# Patient Record
Sex: FEMALE
[De-identification: names, ages, dates, MRNs, and addresses within clinical notes are randomized per-mention and may not be internally consistent; named-entity substitution may affect disease eponyms.]

---

## 2020-03-16 ENCOUNTER — HOSPITAL ENCOUNTER (OUTPATIENT)
Dept: HOSPITAL 90 - RAH | Age: 40
Discharge: HOME | End: 2020-03-16
Attending: INTERNAL MEDICINE
Payer: COMMERCIAL

## 2020-03-16 DIAGNOSIS — Z12.31: Primary | ICD-10-CM

## 2020-03-16 PROCEDURE — 77067 SCR MAMMO BI INCL CAD: CPT

## 2021-12-06 ENCOUNTER — HOSPITAL ENCOUNTER (OUTPATIENT)
Dept: HOSPITAL 90 - RAH | Age: 41
Discharge: HOME | End: 2021-12-06
Attending: INTERNAL MEDICINE
Payer: COMMERCIAL

## 2021-12-06 DIAGNOSIS — Z12.31: Primary | ICD-10-CM

## 2021-12-06 PROCEDURE — 77067 SCR MAMMO BI INCL CAD: CPT

## 2023-06-05 ENCOUNTER — HOSPITAL ENCOUNTER (OUTPATIENT)
Dept: HOSPITAL 90 - RAH | Age: 43
Discharge: HOME | End: 2023-06-05
Attending: INTERNAL MEDICINE
Payer: COMMERCIAL

## 2023-06-05 DIAGNOSIS — Z12.31: Primary | ICD-10-CM

## 2023-06-05 PROCEDURE — 77067 SCR MAMMO BI INCL CAD: CPT

## 2025-02-10 ENCOUNTER — HOSPITAL ENCOUNTER (OUTPATIENT)
Dept: HOSPITAL 90 - RAH | Age: 45
Discharge: HOME | End: 2025-02-10
Payer: COMMERCIAL

## 2025-02-10 DIAGNOSIS — R92.323: ICD-10-CM

## 2025-02-10 DIAGNOSIS — Z12.31: Primary | ICD-10-CM

## 2025-02-10 PROCEDURE — 77067 SCR MAMMO BI INCL CAD: CPT

## 2025-02-10 NOTE — [facility]
MAMMO SCREENING BILATERAL



HISTORY: Screening mammogram.



COMPARISON: 6/5/2023



TECHNIQUE: Bilateral screening mammogram with CAD was performed with

craniocaudal and mediolateral oblique projections.



FINDINGS: There are scattered areas of fibroglandular density.  There is

no evidence of a dominant mass, or suspicious microcalcification.  There

is no evidence of nipple retraction or skin thickening.



IMPRESSION:

1. Stable mammogram.



Patient was entered into a reminder system with a target due date for

their next mammogram.



BI-RADS:

CATEGORY 2: BENIGN FINDINGS



Recommend monthly self breast exam as well as annual clinical

examination.  



A negative x-ray should not delay biopsy if a dominant or clinically

suspicious mass is present, since 8-10% of cancers are not identified by

mammography.  Dense breasts particularly, may obscure an underlying

neoplasm.  Some of these may be detected clinically and therefore,

clinical examination is an essential part of breast evaluation.